# Patient Record
Sex: MALE
[De-identification: names, ages, dates, MRNs, and addresses within clinical notes are randomized per-mention and may not be internally consistent; named-entity substitution may affect disease eponyms.]

---

## 2020-08-28 LAB
ANION GAP SERPL CALC-SCNC: 15.3 MMOL/L (ref 8–16)
BASOPHILS # BLD AUTO: 0 10*3/UL (ref 0–0.1)
BASOPHILS NFR BLD AUTO: 0.2 % (ref 0–1)
BUN SERPL-MCNC: 14 MG/DL (ref 7–26)
BUN/CREAT SERPL: 14 (ref 6–25)
CALCIUM SERPL-MCNC: 9.3 MG/DL (ref 8.4–10.2)
CHLORIDE SERPL-SCNC: 106 MMOL/L (ref 98–107)
CO2 SERPL-SCNC: 23 MMOL/L (ref 22–29)
DEPRECATED NEUTROPHILS # BLD AUTO: 5.4 10*3/UL (ref 2.1–6.9)
EGFRCR SERPLBLD CKD-EPI 2021: > 60 ML/MIN (ref 60–?)
EOSINOPHIL # BLD AUTO: 0.1 10*3/UL (ref 0–0.4)
EOSINOPHIL NFR BLD AUTO: 1 % (ref 0–6)
ERYTHROCYTE [DISTWIDTH] IN CORD BLOOD: 12.3 % (ref 11.7–14.4)
GLUCOSE SERPLBLD-MCNC: 93 MG/DL (ref 74–118)
HCT VFR BLD AUTO: 45.9 % (ref 38.2–49.6)
HGB BLD-MCNC: 14.8 G/DL (ref 14–18)
LYMPHOCYTES # BLD: 2.1 10*3/UL (ref 1–3.2)
LYMPHOCYTES NFR BLD AUTO: 24.8 % (ref 18–39.1)
MCH RBC QN AUTO: 28.5 PG (ref 28–32)
MCHC RBC AUTO-ENTMCNC: 32.2 G/DL (ref 31–35)
MCV RBC AUTO: 88.3 FL (ref 81–99)
MONOCYTES # BLD AUTO: 0.8 10*3/UL (ref 0.2–0.8)
MONOCYTES NFR BLD AUTO: 9.9 % (ref 4.4–11.3)
NEUTS SEG NFR BLD AUTO: 63.9 % (ref 38.7–80)
PLATELET # BLD AUTO: 203 X10E3/UL (ref 140–360)
POTASSIUM SERPL-SCNC: 4.3 MMOL/L (ref 3.5–5.1)
RBC # BLD AUTO: 5.2 X10E6/UL (ref 4.3–5.7)
SODIUM SERPL-SCNC: 140 MMOL/L (ref 136–145)

## 2020-08-28 NOTE — DIAGNOSTIC IMAGING REPORT
EXAMINATION:  CHEST 2 VIEWS    



INDICATION: Preoperative evaluation of the lungs.



COMPARISON:  None

     

FINDINGS:  



TUBES and LINES:  None.



LUNGS:  Normal lung volumes.  Lungs are clear.  No consolidations. There is

bibasilar atelectasis.



PLEURA:  No pleural effusion or pneumothorax.



HEART AND MEDIASTINUM:  The cardiomediastinal silhouette is unremarkable.    



BONES AND SOFT TISSUES:  No acute osseous lesion.  Soft tissues are

unremarkable.



UPPER ABDOMEN: No free air under the diaphragm.    



IMPRESSION:

 

Normal chest radiograph.





Signed by: Maxine Joaquin MD on 8/28/2020 4:52 PM

## 2020-09-02 ENCOUNTER — HOSPITAL ENCOUNTER (OUTPATIENT)
Dept: HOSPITAL 88 - OR | Age: 50
Discharge: HOME | End: 2020-09-02
Attending: SURGERY
Payer: COMMERCIAL

## 2020-09-02 VITALS — SYSTOLIC BLOOD PRESSURE: 111 MMHG | DIASTOLIC BLOOD PRESSURE: 76 MMHG

## 2020-09-02 DIAGNOSIS — I10: ICD-10-CM

## 2020-09-02 DIAGNOSIS — Z01.810: ICD-10-CM

## 2020-09-02 DIAGNOSIS — K40.90: Primary | ICD-10-CM

## 2020-09-02 DIAGNOSIS — Z01.818: ICD-10-CM

## 2020-09-02 DIAGNOSIS — Z01.812: ICD-10-CM

## 2020-09-02 DIAGNOSIS — Z11.59: ICD-10-CM

## 2020-09-02 DIAGNOSIS — G47.33: ICD-10-CM

## 2020-09-02 DIAGNOSIS — J45.909: ICD-10-CM

## 2020-09-02 PROCEDURE — 85025 COMPLETE CBC W/AUTO DIFF WBC: CPT

## 2020-09-02 PROCEDURE — 71046 X-RAY EXAM CHEST 2 VIEWS: CPT

## 2020-09-02 PROCEDURE — 80048 BASIC METABOLIC PNL TOTAL CA: CPT

## 2020-09-02 PROCEDURE — 36415 COLL VENOUS BLD VENIPUNCTURE: CPT

## 2020-09-02 PROCEDURE — 93005 ELECTROCARDIOGRAM TRACING: CPT

## 2020-09-02 PROCEDURE — 49505 PRP I/HERN INIT REDUC >5 YR: CPT

## 2020-09-02 NOTE — OPERATIVE REPORT
DATE OF PROCEDURE:  09/02/2020

 

SURGEON:  Javier Zaragoza MD

 

PREOPERATIVE DIAGNOSIS:  Right inguinal hernia.

 

POSTOPERATIVE DIAGNOSIS:  Right inguinal hernia.

 

OPERATION PERFORMED:  Repair of right inguinal hernia with extended Prolene Hernia

System. 

 

ASSISTANT:  MARGOTH Armendariz.

 

ANESTHESIA:  General.

 

COMPLICATIONS:  None.

 

ESTIMATED BLOOD LOSS:  Minimal.

 

DESCRIPTION OF PROCEDURE:  With the patient lying in bed in the supine position under

good general anesthesia, the abdomen was prepped with Betadine solution and draped in

the usual manner.  A right inguinal incision was made.  It was carried down through the

subcutaneous tissue down to the external oblique aponeurosis.  External oblique was

opened along the length of its fibers and the external inguinal ring was opened.  The

cord was then mobilized and retracted.  Exploration of the cord did not reveal the

presence of any indirect hernia.  Exploration of the direct space, however, revealed a

large bulging defect in the direct space.  This direct hernia was then imbricated with a

pursestring suture of 2-0 silk.  The preperitoneal space was then entered and a pocket

was created without any difficulty.  An extended Prolene Hernia System was then placed

in the preperitoneal space and the underlay patch was deployed without any problems.

The overlay patch was then placed over the floor and split inferolaterally to allow for

passage of the cord.  The mesh was then sutured to the conjoined tendon and the inguinal

ligament using interrupted sutures of 2-0 Vicryl.  The whole area was thoroughly

irrigated.  Perfect hemostasis was ascertained.  All layers were infiltrated on the way

out with solution of 0.25% Marcaine.  The external oblique aponeurosis was closed with a

running suture of 2-0 Vicryl.  The subcutaneous tissue was approximated with 3-0 plain

and the skin was closed with clips.  A dressing was applied.  The sponge, lap, and

needle count was correct.  The patient tolerated the procedure well and returned to the

recovery room in stable condition. 

 

 

 

 

______________________________

Javier Zaragoza MD

 

JLR/MODL

D:  09/02/2020 11:11:35

T:  09/02/2020 11:49:26

Job #:  [id number]

## 2020-09-23 ENCOUNTER — HOSPITAL ENCOUNTER (OUTPATIENT)
Dept: HOSPITAL 88 - OR | Age: 50
Discharge: HOME | End: 2020-09-23
Attending: SURGERY
Payer: COMMERCIAL

## 2020-09-23 VITALS — DIASTOLIC BLOOD PRESSURE: 76 MMHG | SYSTOLIC BLOOD PRESSURE: 118 MMHG

## 2020-09-23 DIAGNOSIS — D17.6: ICD-10-CM

## 2020-09-23 DIAGNOSIS — G47.33: ICD-10-CM

## 2020-09-23 DIAGNOSIS — Z01.812: ICD-10-CM

## 2020-09-23 DIAGNOSIS — K40.90: Primary | ICD-10-CM

## 2020-09-23 PROCEDURE — 49505 PRP I/HERN INIT REDUC >5 YR: CPT

## 2020-09-23 NOTE — OPERATIVE REPORT
DATE OF PROCEDURE:  09/23/2020

 

SURGEON:  Javier Zaragoza MD

 

PREOPERATIVE DIAGNOSIS:  Left inguinal hernia.

 

POSTOPERATIVE DIAGNOSIS:  Left inguinal hernia.

 

OPERATION PERFORMED:  Repair of left inguinal hernia with extended Prolene Hernia System.

 

ANESTHESIA:  General.

 

COMPLICATIONS:  None.

 

ESTIMATED BLOOD LOSS:  Minimal.

 

DESCRIPTION OF PROCEDURE:  With the patient lying in bed in the supine position under

good general anesthesia, the abdomen was prepped with Betadine solution and draped in

the usual manner.  A left inguinal incision was made.  It was carried down through the

subcutaneous tissue down to the external oblique aponeurosis.  External oblique was then

opened along the length of its fibers and external inguinal ring was opened.  The cord

was then mobilized and retracted.  Exploration of the cord revealed a small lipoma of

the cord, which was  from the cord structures, ligated with 2-0 Vicryl and

divided.  There was no indirect hernia sac present in the direct space, however, was a

bulging hernia, which was  from adhesions and was then imbricated with a

pursestring suture of 2-0 silk.  After this was done, the preperitoneal space was then

entered right through the internal ring and a pocket was created without any difficulty.

 An extended Prolene Hernia System was placed in the preperitoneal space and the

underlay patch was deployed without any problems.  The overlay patch was then placed

over the floor and split inferolaterally to allow for passage of the cord.  The mesh was

then sutured to the conjoined tendon and the inguinal ligament using interrupted sutures

of 2-0 Vicryl.  All layers were infiltrated on the way out with solution of 0.25%

Marcaine and 1% lidocaine mixed in equal parts.  The external oblique aponeurosis was

closed with a running suture of 2-0 Vicryl.  The subcutaneous tissue was approximated

with 3-0 plain and the skin was closed with clips.  A dressing was applied.  The sponge,

lap, and needle count was correct.  The patient tolerated the procedure well and

returned to the recovery room in stable condition. 

 

 

 

 

______________________________

Javier Zaragoza MD

 

JLR/MODL

D:  09/23/2020 11:47:19

T:  09/23/2020 12:53:43

Job #:  [id number]